# Patient Record
Sex: MALE | Race: WHITE | Employment: PART TIME | URBAN - METROPOLITAN AREA
[De-identification: names, ages, dates, MRNs, and addresses within clinical notes are randomized per-mention and may not be internally consistent; named-entity substitution may affect disease eponyms.]

---

## 2019-08-30 ENCOUNTER — RESULTS ONLY (OUTPATIENT)
Dept: ONCOLOGY | Facility: CLINIC | Age: 19
End: 2019-08-30

## 2019-08-30 ENCOUNTER — RESEARCH ENCOUNTER (OUTPATIENT)
Dept: TRANSPLANT | Facility: CLINIC | Age: 19
End: 2019-08-30

## 2019-08-30 ENCOUNTER — APPOINTMENT (OUTPATIENT)
Dept: TRANSPLANT | Facility: CLINIC | Age: 19
End: 2019-08-30
Attending: INTERNAL MEDICINE

## 2019-08-30 ENCOUNTER — RESULTS ONLY (OUTPATIENT)
Dept: LAB | Age: 19
End: 2019-08-30

## 2019-08-30 ENCOUNTER — RESULTS ONLY (OUTPATIENT)
Dept: OTHER | Facility: CLINIC | Age: 19
End: 2019-08-30

## 2019-08-30 PROCEDURE — 36415 COLL VENOUS BLD VENIPUNCTURE: CPT

## 2019-08-30 NOTE — NURSING NOTE
Chief Complaint   Patient presents with     Labs Only     Labs drawn by RN in Lab via Right Arm VPT.      Fatoumata Patterson RN

## 2019-08-30 NOTE — PROGRESS NOTES
[id number]: Informed Consent Note     The consent form, including purpose, risks and benefits, was reviewed with Woo Cesar, and all questions were answered before he signed the consent form. The patient understands that the study involves a pre-screening phase, a screening phase, and a donation phase. They understand that re-treatment may be possible.    Present during the discussion were Woo Cesar and myself. A copy of the signed form was provided to the patient. No procedures specific to this study were performed prior to the patient signing the consent form.    Consent Version Date: 7/16/19  Consent obtained by: David Mckinnon    Date: August 30, 2019  HIPAA authorization signed?: yes  HIPAA authorization version date: 8/25/17    David Mckinnon    Form 503.03.01 (Version 2)     Effective date: 01AUG2018     Next Review Date: 01AUG2020

## 2019-08-31 LAB — CMV IGG SERPL QL IA: 0.2 AI (ref 0–0.8)

## 2019-09-05 LAB
A* LOCUS: NORMAL
A*: NORMAL
ABTEST METHOD: NORMAL
B* LOCUS: NORMAL
B*: NORMAL
BW-1: NORMAL
C* LOCUS: NORMAL
C*: NORMAL